# Patient Record
Sex: FEMALE | URBAN - METROPOLITAN AREA
[De-identification: names, ages, dates, MRNs, and addresses within clinical notes are randomized per-mention and may not be internally consistent; named-entity substitution may affect disease eponyms.]

---

## 2022-06-13 ENCOUNTER — APPOINTMENT (RX ONLY)
Dept: URBAN - METROPOLITAN AREA CLINIC 74 | Facility: CLINIC | Age: 22
Setting detail: DERMATOLOGY
End: 2022-06-13

## 2022-06-13 DIAGNOSIS — L68.0 HIRSUTISM: ICD-10-CM | Status: INADEQUATELY CONTROLLED

## 2022-06-13 PROCEDURE — ? PRESCRIPTION

## 2022-06-13 PROCEDURE — ? MEDICAL CONSULTATION: LHR

## 2022-06-13 PROCEDURE — ? TREATMENT REGIMEN

## 2022-06-13 PROCEDURE — 99204 OFFICE O/P NEW MOD 45 MIN: CPT

## 2022-06-13 PROCEDURE — ? PATIENT SPECIFIC COUNSELING

## 2022-06-13 PROCEDURE — ? COUNSELING

## 2022-06-13 RX ORDER — SPIRONOLACTONE 25 MG/1
TABLET, FILM COATED ORAL
Qty: 30 | Refills: 0 | Status: ERX | COMMUNITY
Start: 2022-06-13

## 2022-06-13 RX ADMIN — SPIRONOLACTONE: 25 TABLET, FILM COATED ORAL at 00:00

## 2022-06-13 ASSESSMENT — LOCATION DETAILED DESCRIPTION DERM
LOCATION DETAILED: RIGHT SUPERIOR ANTERIOR NECK
LOCATION DETAILED: RIGHT CHIN

## 2022-06-13 ASSESSMENT — LOCATION ZONE DERM
LOCATION ZONE: NECK
LOCATION ZONE: FACE

## 2022-06-13 ASSESSMENT — LOCATION SIMPLE DESCRIPTION DERM
LOCATION SIMPLE: RIGHT ANTERIOR NECK
LOCATION SIMPLE: CHIN

## 2022-06-13 NOTE — PROCEDURE: MIPS QUALITY
Additional Notes: Pt received COVID vaccines
Detail Level: Detailed
Quality 130: Documentation Of Current Medications In The Medical Record: Current Medications Documented
Quality 110: Preventive Care And Screening: Influenza Immunization: Influenza Immunization Administered during Influenza season

## 2022-06-13 NOTE — PROCEDURE: PATIENT SPECIFIC COUNSELING
Detail Level: Zone
-pt presents dark, coarse unwanted hair on chin and neck and other parts of body for years\\n-pt reports hair on neck and chin has been growing excessively x 5 yrs\\n-pt reports DHEA and aldosterone levels are mildly high in previous bloodwork (Jan 2022)\\n-pt will email most recent bloodwork\\n-pt suspects PCOS \\n-disc r/b/se OCP (Sandra kim) vs r/b/se Spironolactone; pt will d/w mom who is a physician\\n-pt sees endocrinologist in [place]: dr phillip pearl\\n-pt could not tolerate alessio *1d but willing to re-try; given ocps but also did not start\\n-disc r/b/se anti-androgen medications \\n-initiate spironolactone 25mg x 1 month - monitor se's\\n-disc titrate dosage of alessio at follow up (pt at [facility] for [facility])\\n-disc r/b/se laser hair removal (long pulse Nd yag)\\n-quoted $220 per tx for laser hair removal; mult tx\\nrtc prn/2-3mo

## 2022-06-13 NOTE — HPI: UNWANTED HAIR
How Did Your Unwanted Hair Appear?: gradual in onset
How Severe Is Your Unwanted Hair?: moderate
Additional History: Pt presents unwanted hair on chin and neck. Pt reports hair has been growing excessively x 5 yrs. Pt has waxed and derma-planed area of unwanted hair.
Speak to MD when make rounds